# Patient Record
Sex: MALE | Race: OTHER | NOT HISPANIC OR LATINO | URBAN - METROPOLITAN AREA
[De-identification: names, ages, dates, MRNs, and addresses within clinical notes are randomized per-mention and may not be internally consistent; named-entity substitution may affect disease eponyms.]

---

## 2022-10-18 VITALS
WEIGHT: 202.38 LBS | SYSTOLIC BLOOD PRESSURE: 133 MMHG | HEIGHT: 68 IN | TEMPERATURE: 96.8 F | BODY MASS INDEX: 30.67 KG/M2 | RESPIRATION RATE: 16 BRPM | DIASTOLIC BLOOD PRESSURE: 86 MMHG | HEART RATE: 93 BPM | OXYGEN SATURATION: 98 %

## 2022-10-18 LAB — EKG IMPRESSION: NORMAL

## 2022-10-18 PROCEDURE — 302449 STATCHG TRIAGE ONLY (STATISTIC)

## 2022-10-18 PROCEDURE — 93005 ELECTROCARDIOGRAM TRACING: CPT

## 2022-10-19 ENCOUNTER — HOSPITAL ENCOUNTER (EMERGENCY)
Facility: MEDICAL CENTER | Age: 42
End: 2022-10-19

## 2022-10-19 NOTE — ED TRIAGE NOTES
Chief Complaint   Patient presents with    Chest Pain     Pain in left side of chest x2 hours; pain does not radiate     Pt ambulatory to triage for above complaint. Pt has hx of MI with stent placement in 2020 in [place]. Pt denies any other complaints. EKG done prior to triage.     Pt is alert/oriented and follows commands. Pt speaking in full sentences and responds appropriately to questions. No acute distress noted in triage and respirations are even and unlabored.     Pt placed in lobby and educated on triage process. Pt encouraged to alert staff for any changes in condition.

## 2022-10-19 NOTE — ED NOTES
Called multiple times for RV and rooming, no response. Patient went outside and hour ago and never came back in.

## 2022-10-19 NOTE — ED NOTES
Went to call pt back to room at 0115. Pt was not in the lobby. Will attempt to call pt to room again in 15 minutes.